# Patient Record
Sex: MALE | Race: WHITE | Employment: OTHER | URBAN - METROPOLITAN AREA
[De-identification: names, ages, dates, MRNs, and addresses within clinical notes are randomized per-mention and may not be internally consistent; named-entity substitution may affect disease eponyms.]

---

## 2017-01-24 ENCOUNTER — HOSPITAL ENCOUNTER (OUTPATIENT)
Facility: CLINIC | Age: 72
Discharge: HOME | End: 2017-01-24
Attending: OPHTHALMOLOGY | Admitting: OPHTHALMOLOGY
Payer: MEDICARE

## 2017-01-24 ENCOUNTER — SURGERY (OUTPATIENT)
Age: 72
End: 2017-01-24

## 2017-01-24 VITALS
RESPIRATION RATE: 18 BRPM | OXYGEN SATURATION: 99 % | SYSTOLIC BLOOD PRESSURE: 129 MMHG | HEART RATE: 60 BPM | TEMPERATURE: 96.6 F | DIASTOLIC BLOOD PRESSURE: 95 MMHG

## 2017-01-24 PROCEDURE — 25000132 ZZH RX MED GY IP 250 OP 250 PS 637: Mod: GY | Performed by: OPHTHALMOLOGY

## 2017-01-24 PROCEDURE — 25000125 ZZHC RX 250: Performed by: OPHTHALMOLOGY

## 2017-01-24 PROCEDURE — 66821 AFTER CATARACT LASER SURGERY: CPT | Mod: RT | Performed by: OPHTHALMOLOGY

## 2017-01-24 RX ORDER — PHENYLEPHRINE HYDROCHLORIDE 25 MG/ML
1 SOLUTION/ DROPS OPHTHALMIC ONCE
Status: COMPLETED | OUTPATIENT
Start: 2017-01-24 | End: 2017-01-24

## 2017-01-24 RX ORDER — TROPICAMIDE 10 MG/ML
1 SOLUTION/ DROPS OPHTHALMIC ONCE
Status: COMPLETED | OUTPATIENT
Start: 2017-01-24 | End: 2017-01-24

## 2017-01-24 RX ADMIN — TROPICAMIDE 1 DROP: 10 SOLUTION/ DROPS OPHTHALMIC at 12:16

## 2017-01-24 RX ADMIN — APRACLONIDINE HYDROCHLORIDE 1 DROP: 10 SOLUTION/ DROPS OPHTHALMIC at 12:17

## 2017-01-24 RX ADMIN — PHENYLEPHRINE HYDROCHLORIDE 1 DROP: 25 SOLUTION/ DROPS OPHTHALMIC at 12:16

## 2017-01-24 NOTE — BRIEF OP NOTE
[facility] Brief Operative Note    Pre-operative diagnosis: RIGHT EYE SECONDARY MEMBRANE   Post-operative diagnosis posterior capsular opacification, right eye     Procedure: Procedure(s):  RIGHT EYE YAG LASER CAPSULOTOMY   - Wound Class: I-Clean   Surgeon(s): Surgeon(s) and Role:     * Savana Kirkland MD - Primary   Estimated blood loss: * No values recorded between 1/24/2017 12:00 AM and 1/24/2017 12:42 PM *    Specimens: * No specimens in log *   Findings:

## 2017-01-24 NOTE — OP NOTE
[facility]  Ophthalmology Operative Note    Procedure: Yag laser capsulotomy  Diagnosis: secondary membrane (after cataract)  Eye: right    Surgeon: Savana Kirkland MD  Settings: 2.3 mJ energy/burst                 7 bursts    1 drop iopidine instilled after procedure.      Comments: Pt. to follow up at office in 48 hours.

## 2017-07-11 ENCOUNTER — HOSPITAL ENCOUNTER (OUTPATIENT)
Facility: CLINIC | Age: 72
End: 2017-07-11
Attending: OPHTHALMOLOGY | Admitting: OPHTHALMOLOGY
Payer: COMMERCIAL

## 2017-08-01 ENCOUNTER — SURGERY (OUTPATIENT)
Age: 72
End: 2017-08-01

## 2017-08-01 ENCOUNTER — HOSPITAL ENCOUNTER (OUTPATIENT)
Facility: CLINIC | Age: 72
Discharge: HOME | End: 2017-08-01
Attending: OPHTHALMOLOGY | Admitting: OPHTHALMOLOGY
Payer: COMMERCIAL

## 2017-08-01 PROCEDURE — 65760 KERATOMILEUSIS: CPT | Performed by: OPHTHALMOLOGY

## 2017-12-07 ENCOUNTER — TRANSFERRED RECORDS (OUTPATIENT)
Dept: HEALTH INFORMATION MANAGEMENT | Facility: CLINIC | Age: 72
End: 2017-12-07

## 2017-12-13 ENCOUNTER — TRANSFERRED RECORDS (OUTPATIENT)
Dept: HEALTH INFORMATION MANAGEMENT | Facility: CLINIC | Age: 72
End: 2017-12-13

## 2018-02-01 ENCOUNTER — TRANSFERRED RECORDS (OUTPATIENT)
Dept: HEALTH INFORMATION MANAGEMENT | Facility: CLINIC | Age: 73
End: 2018-02-01

## 2018-02-08 ENCOUNTER — TRANSFERRED RECORDS (OUTPATIENT)
Dept: HEALTH INFORMATION MANAGEMENT | Facility: CLINIC | Age: 73
End: 2018-02-08

## 2018-07-10 ENCOUNTER — HOSPITAL ENCOUNTER (OUTPATIENT)
Facility: CLINIC | Age: 73
Discharge: HOME | End: 2018-07-10
Attending: OPHTHALMOLOGY | Admitting: OPHTHALMOLOGY
Payer: MEDICARE

## 2018-07-10 ENCOUNTER — SURGERY (OUTPATIENT)
Age: 73
End: 2018-07-10

## 2018-07-10 ENCOUNTER — ANESTHESIA EVENT (OUTPATIENT)
Dept: SURGERY | Facility: CLINIC | Age: 73
End: 2018-07-10
Payer: MEDICARE

## 2018-07-10 ENCOUNTER — ANESTHESIA (OUTPATIENT)
Dept: SURGERY | Facility: CLINIC | Age: 73
End: 2018-07-10
Payer: MEDICARE

## 2018-07-10 VITALS
DIASTOLIC BLOOD PRESSURE: 92 MMHG | HEIGHT: 70 IN | OXYGEN SATURATION: 96 % | BODY MASS INDEX: 23.34 KG/M2 | RESPIRATION RATE: 16 BRPM | HEART RATE: 57 BPM | WEIGHT: 163 LBS | SYSTOLIC BLOOD PRESSURE: 150 MMHG | TEMPERATURE: 96.5 F

## 2018-07-10 PROCEDURE — 25000128 H RX IP 250 OP 636: Performed by: OPHTHALMOLOGY

## 2018-07-10 PROCEDURE — 25000125 ZZHC RX 250: Performed by: OPHTHALMOLOGY

## 2018-07-10 PROCEDURE — 71000028 ZZH EYE RECOVERY PHASE 2 EACH 15 MINS: Performed by: OPHTHALMOLOGY

## 2018-07-10 PROCEDURE — 36000102 ZZH EYE SURGERY LEVEL 3 EA 15 ADDTL MIN: Performed by: OPHTHALMOLOGY

## 2018-07-10 PROCEDURE — 25000128 H RX IP 250 OP 636: Performed by: NURSE ANESTHETIST, CERTIFIED REGISTERED

## 2018-07-10 PROCEDURE — 25000125 ZZHC RX 250: Performed by: ANESTHESIOLOGY

## 2018-07-10 PROCEDURE — 27210794 ZZH OR GENERAL SUPPLY STERILE: Performed by: OPHTHALMOLOGY

## 2018-07-10 PROCEDURE — 25000128 H RX IP 250 OP 636: Performed by: ANESTHESIOLOGY

## 2018-07-10 PROCEDURE — 40000170 ZZH STATISTIC PRE-PROCEDURE ASSESSMENT II: Performed by: OPHTHALMOLOGY

## 2018-07-10 PROCEDURE — 36000101 ZZH EYE SURGERY LEVEL 3 1ST 30 MIN: Performed by: OPHTHALMOLOGY

## 2018-07-10 PROCEDURE — 37000009 ZZH ANESTHESIA TECHNICAL FEE, EACH ADDTL 15 MIN: Performed by: OPHTHALMOLOGY

## 2018-07-10 PROCEDURE — 37000008 ZZH ANESTHESIA TECHNICAL FEE, 1ST 30 MIN: Performed by: OPHTHALMOLOGY

## 2018-07-10 RX ORDER — TETRACAINE HYDROCHLORIDE 5 MG/ML
SOLUTION OPHTHALMIC PRN
Status: DISCONTINUED | OUTPATIENT
Start: 2018-07-10 | End: 2018-07-10 | Stop reason: HOSPADM

## 2018-07-10 RX ORDER — FENTANYL CITRATE 50 UG/ML
INJECTION, SOLUTION INTRAMUSCULAR; INTRAVENOUS PRN
Status: DISCONTINUED | OUTPATIENT
Start: 2018-07-10 | End: 2018-07-10

## 2018-07-10 RX ORDER — VITS A,C,E/LUTEIN/MINERALS 300MCG-200
1 TABLET ORAL DAILY
COMMUNITY

## 2018-07-10 RX ORDER — ONDANSETRON 2 MG/ML
INJECTION INTRAMUSCULAR; INTRAVENOUS PRN
Status: DISCONTINUED | OUTPATIENT
Start: 2018-07-10 | End: 2018-07-10

## 2018-07-10 RX ORDER — SODIUM CHLORIDE, SODIUM LACTATE, POTASSIUM CHLORIDE, CALCIUM CHLORIDE 600; 310; 30; 20 MG/100ML; MG/100ML; MG/100ML; MG/100ML
INJECTION, SOLUTION INTRAVENOUS CONTINUOUS
Status: DISCONTINUED | OUTPATIENT
Start: 2018-07-10 | End: 2018-07-10 | Stop reason: HOSPADM

## 2018-07-10 RX ORDER — PROPOFOL 10 MG/ML
INJECTION, EMULSION INTRAVENOUS PRN
Status: DISCONTINUED | OUTPATIENT
Start: 2018-07-10 | End: 2018-07-10

## 2018-07-10 RX ORDER — NEOMYCIN SULFATE, POLYMYXIN B SULFATE, AND DEXAMETHASONE 3.5; 10000; 1 MG/G; [USP'U]/G; MG/G
OINTMENT OPHTHALMIC PRN
Status: DISCONTINUED | OUTPATIENT
Start: 2018-07-10 | End: 2018-07-10 | Stop reason: HOSPADM

## 2018-07-10 RX ADMIN — SODIUM CHLORIDE, POTASSIUM CHLORIDE, SODIUM LACTATE AND CALCIUM CHLORIDE: 600; 310; 30; 20 INJECTION, SOLUTION INTRAVENOUS at 13:05

## 2018-07-10 RX ADMIN — LIDOCAINE HYDROCHLORIDE 0.5 ML: 10 INJECTION, SOLUTION EPIDURAL; INFILTRATION; INTRACAUDAL; PERINEURAL at 13:05

## 2018-07-10 RX ADMIN — LIDOCAINE HYDROCHLORIDE 6.5 ML: 10; .005 INJECTION, SOLUTION EPIDURAL; INFILTRATION; INTRACAUDAL; PERINEURAL at 14:44

## 2018-07-10 RX ADMIN — FENTANYL CITRATE 25 MCG: 50 INJECTION, SOLUTION INTRAMUSCULAR; INTRAVENOUS at 14:49

## 2018-07-10 RX ADMIN — MIDAZOLAM 2 MG: 1 INJECTION INTRAMUSCULAR; INTRAVENOUS at 14:12

## 2018-07-10 RX ADMIN — NEOMYCIN SULFATE, POLYMYXIN B SULFATE, AND DEXAMETHASONE 1 TUBE: 3.5; 10000; 1 OINTMENT OPHTHALMIC at 14:53

## 2018-07-10 RX ADMIN — PROPOFOL 10 MG: 10 INJECTION, EMULSION INTRAVENOUS at 14:23

## 2018-07-10 RX ADMIN — TETRACAINE HYDROCHLORIDE 1 DROP: 5 SOLUTION OPHTHALMIC at 14:15

## 2018-07-10 RX ADMIN — FENTANYL CITRATE 25 MCG: 50 INJECTION, SOLUTION INTRAMUSCULAR; INTRAVENOUS at 14:57

## 2018-07-10 RX ADMIN — PROPOFOL 30 MG: 10 INJECTION, EMULSION INTRAVENOUS at 14:21

## 2018-07-10 RX ADMIN — ONDANSETRON 4 MG: 2 INJECTION INTRAMUSCULAR; INTRAVENOUS at 14:18

## 2018-07-10 RX ADMIN — FENTANYL CITRATE 50 MCG: 50 INJECTION, SOLUTION INTRAMUSCULAR; INTRAVENOUS at 14:12

## 2018-07-10 ASSESSMENT — LIFESTYLE VARIABLES: TOBACCO_USE: 0

## 2018-07-10 ASSESSMENT — COPD QUESTIONNAIRES: COPD: 0

## 2018-07-10 NOTE — BRIEF OP NOTE
[facility] Brief Operative Note    Pre-operative diagnosis: DERMATOCHALASIS   Post-operative diagnosis bilateral upper lid dermatochalasis     Procedure: Procedure(s):  BILATERAL UPPER LID BLEPHAROPLASTY  - Wound Class: I-Clean   Surgeon(s): Surgeon(s) and Role:     * Savana Kirkland MD - Primary   Estimated blood loss: * No values recorded between 7/10/2018  2:24 PM and 7/10/2018  3:07 PM *    Specimens: * No specimens in log *   Findings:

## 2018-07-10 NOTE — ANESTHESIA CARE TRANSFER NOTE
Patient: Los Bone    Procedure(s):  BILATERAL UPPER LID BLEPHAROPLASTY  - Wound Class: I-Clean    Diagnosis: DERMATOCHALASIS  Diagnosis Additional Information: No value filed.    Anesthesia Type:   MAC     Note:  Airway :Room Air  Patient transferred to:PACU  Handoff Report: Identifed the Patient, Identified the Reponsible Provider, Reviewed the pertinent medical history, Discussed the surgical course, Reviewed Intra-OP anesthesia mangement and issues during anesthesia, Set expectations for post-procedure period and Allowed opportunity for questions and acknowledgement of understanding    Transferred to [facility] recovery room in recliner with armrests up, spontaneous respirations, O2 saturation maintained greater than 92% with oxygen via room air. All monitors and alarms on and functioning, clinically stable vital signs. Report given to recovery RN and questions answered. Patient alert and following verbal directions.    Electronically Signed By: ZAFAR Bustamante CRNA  July 10, 2018  3:10 PM

## 2018-07-10 NOTE — DISCHARGE INSTRUCTIONS
POST-OPERATIVE CARE FOLLOWING EYELID SURGERY  DR. MERLE ROBLES  [facility]  [phone]  ICE COMPRESSES:  Immediately following surgery, you should begin to apply ice compresses.  Apply a cold gel pack, which can be purchased at your drug store, or wrap a clean washcloth around a cup of crushed ice in a plastic bag (a bag of frozen peas also works well) and hold the cold compresses directly against the closed eyelid(s). Do this at least 6 times per day for 15 minutes, but not while sleeping.  Continue cold compresses for the first 3 days after surgery, or longer if swelling persists.  OINTMENT:  Apply MAXITROL OINTMENT (from the O.R.) to the surgical area (along the sutures if present) 3 TIMES PER DAY FOR 1 WEEK OR UNTIL GONE.  If you get ointment in your eye, it will blur your vision slightly, but will not harm you eye.  ACTIVITY:  Avoid heavy lifting or vigorous exercise for one week after surgery.  You may resume regular activities as tolerated.  You may shower and wash your hair on the day after surgery, be careful to avoid getting shampoo in your eyes. Sleeping with your head elevated on an extra pillow will help with swelling.  MEDICATION:  Some discomfort and tenderness may be noticed around the eye.  You may take Extra Strength Tylenol or Advil for mild pain.    WHAT TO EXPECT:  You should expect some slight oozing of blood from the incision site over the first two to three days after surgery.  Swelling and bruising will occur for one to two weeks or longer.  You may also experience itching and tearing during the first several weeks after surgery.  This is part of the normal healing process.  CALL THE DOCTOR S OFFICE WITH ANY QUESTIONS OR CONCERNS, [phone].      [facility] Anesthesia [facility] Discharge  Instructions  Anesthesia (Eye Care Center)   Adult Discharge Instructions    For 24 hours after surgery    1. Get plenty of rest.  Make arrangements to have a responsible adult  stay with you for at least 24 hours after you leave the hospital.  2. Do not drive or use heavy equipment for 24 hours.    3. Do not drink alcohol for 24 hours.  4. Do not sign legal documents or make important decisions for 24 hours.  5. Avoid strenuous or risky activities. You may feel lightheaded.  If so, sit for a few minutes before standing.  Have someone help you get up.   6. Conscious sedation patients may resume a regular diet..  7. Any questions of medical nature, call your physician.

## 2018-07-10 NOTE — ANESTHESIA POSTPROCEDURE EVALUATION
Patient: Los Bone    Procedure(s):  BILATERAL UPPER LID BLEPHAROPLASTY  - Wound Class: I-Clean    Diagnosis:DERMATOCHALASIS  Diagnosis Additional Information: No value filed.    Anesthesia Type:  MAC    Note:  Anesthesia Post Evaluation    Patient location during evaluation: PACU  Patient participation: Able to fully participate in evaluation  Level of consciousness: awake  Pain management: adequate  Airway patency: patent  Cardiovascular status: acceptable  Respiratory status: acceptable  Hydration status: acceptable  PONV: none     Anesthetic complications: None          Last vitals:  Vitals:    07/10/18 1255 07/10/18 1509 07/10/18 1525   BP: (!) 143/93 148/90 (!) 150/92   Pulse: 57     Resp: 18 16 16   Temp: 35.8  C (96.5  F)     SpO2: 99% 98% 96%         Electronically Signed By: Robbie Rain MD  July 10, 2018  6:48 PM

## 2018-07-10 NOTE — OP NOTE
PreopDx:  Bilateral upper lid dermatochalasis  PostopDx:  Same  Proc:  Bilateral upper lid blepharoplasty:  After informed consent was obtained, the patient was brought to the operating room and prepped and draped in routine fashion.  The upper eyelid creases were marked and an appropriate amount of upper eyelid tissue was marked to obtain mild eversion of the upper eyelid margins.  A local anesthetic was administered into each upper eyelid.  After adequate anesthesia was achieved, the skin incisions were created with a 15-0 Bard Rubén blade.  An ellipse of skin and subcuticular tissue was excised.  Hemostasis was achieved with unipolar cautery.  The wounds were approximated with 4 6-0 Vicryl supratarsal fixation sutures.  The skin incisions were closed with running 6-0 plain sutures.  maxitrol ophthalmic ointment was placed topically.  The patient was discharged to post-anesthesia recovery in stable condition.  Surg:  MD Valerie  Anes:  Monitored local  Comp:  None  Disp:  stable

## 2018-07-10 NOTE — IP AVS SNAPSHOT
[facility]    [address]    [place] [address]    Phone:  [phone]    Fax:  [phone]                                       After Visit Summary   7/10/2018    Los Bone    MRN: [id number]           After Visit Summary Signature Page     I have received my discharge instructions, and my questions have been answered. I have discussed any challenges I see with this plan with the nurse or doctor.    ..........................................................................................................................................  Patient/Patient Representative Signature      ..........................................................................................................................................  Patient Representative Print Name and Relationship to Patient    ..................................................               ................................................  Date                                            Time    ..........................................................................................................................................  Reviewed by Signature/Title    ...................................................              ..............................................  Date                                                            Time

## 2018-07-10 NOTE — ANESTHESIA PREPROCEDURE EVALUATION
Anesthesia Evaluation     . Pt has had prior anesthetic.     No history of anesthetic complications          ROS/MED HX    ENT/Pulmonary:      (-) tobacco use, asthma, COPD and sleep apnea   Neurologic:       Cardiovascular:     (+) Dyslipidemia, ----. : . . . :. .      (-) CAD   METS/Exercise Tolerance:     Hematologic:         Musculoskeletal:   (+) arthritis, , , -       GI/Hepatic:        (-) GERD and liver disease   Renal/Genitourinary:     (+) chronic renal disease, type: CRI,       Endo:      (-) Type I DM and Type II DM   Psychiatric:         Infectious Disease:         Malignancy:         Other:                     Physical Exam  Normal systems: cardiovascular, pulmonary and dental    Airway   Mallampati: III  TM distance: >3 FB  Neck ROM: full    Dental     Cardiovascular       Pulmonary                     Anesthesia Plan      History & Physical Review  History and physical reviewed and following examination; no interval change.    ASA Status:  2 .    NPO Status:  > 8 hours    Plan for MAC Reason for MAC:  Procedure to face, neck, head or breast  PONV prophylaxis:  Ondansetron (or other 5HT-3)       Postoperative Care  Postoperative pain management:  IV analgesics.      Consents  Anesthetic plan, risks, benefits and alternatives discussed with:  Patient..                          .

## 2018-07-10 NOTE — IP AVS SNAPSHOT
MRN:[id number]                      After Visit Summary   7/10/2018    Los Bone    MRN: [id number]           Thank you!     Thank you for choosing [facility] for your care. Our goal is always to provide you with excellent care. Hearing back from our patients is one way we can continue to improve our services. Please take a few minutes to complete the written survey that you may receive in the mail after you visit with us. Thank you!        Patient Information     Date Of Birth          1945        About your hospital stay     You were admitted on:  July 10, 2018 You last received care in the:  [facility]    You were discharged on:  July 10, 2018       Who to Call     For medical emergencies, please call 911.  For non-urgent questions about your medical care, please call your primary care provider or clinic, [phone]  For questions related to your surgery, please call your surgery clinic        Attending Provider     Provider Merle Lambert MD Ophthalmology       Primary Care Provider Office Phone # Fax #    Placido Nelson [phone]      Further instructions from your care team       POST-OPERATIVE CARE FOLLOWING EYELID SURGERY  DR. MERLE ROBLES  [facility]  [phone]  ICE COMPRESSES:  Immediately following surgery, you should begin to apply ice compresses.  Apply a cold gel pack, which can be purchased at your drug store, or wrap a clean washcloth around a cup of crushed ice in a plastic bag (a bag of frozen peas also works well) and hold the cold compresses directly against the closed eyelid(s). Do this at least 6 times per day for 15 minutes, but not while sleeping.  Continue cold compresses for the first 3 days after surgery, or longer if swelling persists.  OINTMENT:  Apply MAXITROL OINTMENT (from the O.R.) to the surgical area (along the sutures if present) 3 TIMES PER DAY FOR 1 WEEK OR UNTIL GONE.  If you get  ointment in your eye, it will blur your vision slightly, but will not harm you eye.  ACTIVITY:  Avoid heavy lifting or vigorous exercise for one week after surgery.  You may resume regular activities as tolerated.  You may shower and wash your hair on the day after surgery, be careful to avoid getting shampoo in your eyes. Sleeping with your head elevated on an extra pillow will help with swelling.  MEDICATION:  Some discomfort and tenderness may be noticed around the eye.  You may take Extra Strength Tylenol or Advil for mild pain.    WHAT TO EXPECT:  You should expect some slight oozing of blood from the incision site over the first two to three days after surgery.  Swelling and bruising will occur for one to two weeks or longer.  You may also experience itching and tearing during the first several weeks after surgery.  This is part of the normal healing process.  CALL THE DOCTOR S OFFICE WITH ANY QUESTIONS OR CONCERNS, [phone].      [facility] Anesthesia [facility] Care [facility] Discharge  Instructions  Anesthesia (Eye Care Center)   Adult Discharge Instructions    For 24 hours after surgery    1. Get plenty of rest.  Make arrangements to have a responsible adult stay with you for at least 24 hours after you leave the hospital.  2. Do not drive or use heavy equipment for 24 hours.    3. Do not drink alcohol for 24 hours.  4. Do not sign legal documents or make important decisions for 24 hours.  5. Avoid strenuous or risky activities. You may feel lightheaded.  If so, sit for a few minutes before standing.  Have someone help you get up.   6. Conscious sedation patients may resume a regular diet..  7. Any questions of medical nature, call your physician.    Pending Results     No orders found from 7/8/2018 to 7/11/2018.            Admission Information     Date & Time Provider Department Dept. Phone    7/10/2018 Savana Kirkland MD [facility] [place] [phone]      Your Vitals Were     Blood  "Pressure Pulse Temperature Respirations Height Weight    148/90 57 96.5  F (35.8  C) (Temporal) 16 1.778 m (5' 10\") 73.9 kg (163 lb)    Pulse Oximetry BMI (Body Mass Index)                98% 23.39 kg/m2          [employer] Information     [employer] lets you send messages to your doctor, view your test results, renew your prescriptions, schedule appointments and more. To sign up, go to [email or web address] . Click on \"Log in\" on the left side of the screen, which will take you to the Welcome page. Then click on \"Sign up Now\" on the right side of the page.     You will be asked to enter the access code listed below, as well as some personal information. Please follow the directions to create your username and password.     Your access code is: [email or web address]  Expires: 10/8/2018  3:13 PM     Your access code will  in 90 days. If you need help or a new code, please call your [place] clinic or [phone].        Care EveryWhere ID     This is your Care EveryWhere ID. This could be used by other organizations to access your [place] medical records  [id number]        Equal Access to Services     RON POWELL AH: Adriane Leslie, waenmada pearladaha, qaybta kaalmada adeegyada, opal lema. [facility] [phone].    ATENCIÓN: Si habla español, tiene a deleon disposición servicios gratuitos de asistencia lingüística. Llame al [phone].    We comply with applicable federal civil rights laws and [place] laws. We do not discriminate on the basis of race, color, national origin, age, disability, sex, sexual orientation, or gender identity.               Review of your medicines      UNREVIEWED medicines. Ask your doctor about these medicines        Dose / Directions    ASPIRIN PO        Dose:  81 mg   Take 81 mg by mouth daily   Refills:  0       LEVITRA PO        Dose:  20 mg   Take 20 mg by mouth as needed   Refills:  0       multivitamin Tabs tablet        Dose:  1 tablet   Take " 1 tablet by mouth daily   Refills:  0       VITAMIN C PO        Dose:  500 mg   Take 500 mg by mouth daily   Refills:  0                Protect others around you: Learn how to safely use, store and throw away your medicines at www.disposemymeds.org.             Medication List: This is a list of all your medications and when to take them. Check marks below indicate your daily home schedule. Keep this list as a reference.      Medications           Morning Afternoon Evening Bedtime As Needed    ASPIRIN PO   Take 81 mg by mouth daily                                LEVITRA PO   Take 20 mg by mouth as needed                                multivitamin Tabs tablet   Take 1 tablet by mouth daily                                VITAMIN C PO   Take 500 mg by mouth daily

## 2019-09-17 ENCOUNTER — TRANSFERRED RECORDS (OUTPATIENT)
Dept: HEALTH INFORMATION MANAGEMENT | Facility: CLINIC | Age: 74
End: 2019-09-17

## 2019-11-07 ENCOUNTER — TRANSFERRED RECORDS (OUTPATIENT)
Dept: HEALTH INFORMATION MANAGEMENT | Facility: CLINIC | Age: 74
End: 2019-11-07

## 2019-11-20 ENCOUNTER — TRANSFERRED RECORDS (OUTPATIENT)
Dept: HEALTH INFORMATION MANAGEMENT | Facility: CLINIC | Age: 74
End: 2019-11-20

## 2019-11-26 NOTE — TELEPHONE ENCOUNTER
ONCOLOGY INTAKE: Records Information      APPT INFORMATION:  Referring provider: N/a  Referring provider s clinic:  N/a  Reason for visit/diagnosis:  Prostate Cancer  Has patient been notified of appointment date and time?: Yes    RECORDS INFORMATION:  Were the records received with the referral (via [employer])? No    Has patient been seen for any external appt for this diagnosis? Yes    If yes, where? [facility] (Dr. Edgar Ferrell)    Has patient had any imaging or procedures outside of [facility] for this condition? Yes      If Yes, where? [facility] (Dr. Edgar Ferrell)      ADDITIONAL INFORMATION:  None

## 2019-11-27 NOTE — TELEPHONE ENCOUNTER
Action Amanda 11/27 12:13PM   Action Taken Fax to [facility] to obtain recs, imgs and BX's      Action Amanda 12/2 1:11PM   Action Taken Called [facility] to check on med recs req, called and spoke with Kisha. Per Kisha she will process the request today.   BX Slides - LM for Pathologist Dr Medina to Fed ex BX Slides for Thurs 12/5 appt      Action Abi 12/3 7:30AM   Action Taken Received recs from [facility], sent to urgent scanning.   Still awaiting for BX slides

## 2019-12-02 ENCOUNTER — DOCUMENTATION ONLY (OUTPATIENT)
Dept: CARE COORDINATION | Facility: CLINIC | Age: 74
End: 2019-12-02

## 2019-12-05 ENCOUNTER — PRE VISIT (OUTPATIENT)
Dept: UROLOGY | Facility: CLINIC | Age: 74
End: 2019-12-05

## 2019-12-05 ENCOUNTER — OFFICE VISIT (OUTPATIENT)
Dept: UROLOGY | Facility: CLINIC | Age: 74
End: 2019-12-05
Payer: COMMERCIAL

## 2019-12-05 VITALS
BODY MASS INDEX: 23.05 KG/M2 | HEART RATE: 60 BPM | WEIGHT: 161 LBS | DIASTOLIC BLOOD PRESSURE: 100 MMHG | SYSTOLIC BLOOD PRESSURE: 158 MMHG | HEIGHT: 70 IN

## 2019-12-05 DIAGNOSIS — C61 PROSTATE CANCER (H): Primary | ICD-10-CM

## 2019-12-05 ASSESSMENT — MIFFLIN-ST. JEOR: SCORE: 1471.54

## 2019-12-05 ASSESSMENT — PAIN SCALES - GENERAL: PAINLEVEL: NO PAIN (0)

## 2019-12-05 NOTE — NURSING NOTE
Chief Complaint   Patient presents with     Consult For     abnormal test results x 2 weeks       Unique Pedraza MA
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
## 2019-12-05 NOTE — PATIENT INSTRUCTIONS
Please follow up in 6 months with a PSA       It was a pleasure meeting with you today.  Thank you for allowing me and my team the privilege of caring for you today.  YOU are the reason we are here, and I truly hope we provided you with the excellent service you deserve.  Please let us know if there is anything else we can do for you so that we can be sure you are leaving completely satisfied with your care experience.

## 2020-02-23 ENCOUNTER — HEALTH MAINTENANCE LETTER (OUTPATIENT)
Age: 75
End: 2020-02-23

## 2020-12-06 ENCOUNTER — HEALTH MAINTENANCE LETTER (OUTPATIENT)
Age: 75
End: 2020-12-06

## 2021-04-11 ENCOUNTER — HEALTH MAINTENANCE LETTER (OUTPATIENT)
Age: 76
End: 2021-04-11

## 2021-09-26 ENCOUNTER — HEALTH MAINTENANCE LETTER (OUTPATIENT)
Age: 76
End: 2021-09-26

## 2022-05-07 ENCOUNTER — HEALTH MAINTENANCE LETTER (OUTPATIENT)
Age: 77
End: 2022-05-07

## 2023-04-23 ENCOUNTER — HEALTH MAINTENANCE LETTER (OUTPATIENT)
Age: 78
End: 2023-04-23

## 2023-06-02 ENCOUNTER — HEALTH MAINTENANCE LETTER (OUTPATIENT)
Age: 78
End: 2023-06-02

## (undated) DEVICE — SU VICRYL 6-0 S-24DA 12" J552G

## (undated) DEVICE — PEN MARKING SKIN FINE 31145942

## (undated) DEVICE — SOL WATER IRRIG 1000ML BOTTLE 2F7114

## (undated) DEVICE — ESU EYE HIGH TEMP 65410-183

## (undated) DEVICE — GLOVE PROTEXIS W/NEU-THERA 6.5  2D73TE65

## (undated) DEVICE — DECANTER VIAL 2006S

## (undated) DEVICE — EYE PREP BETADINE 5% SOLUTION 30ML 0065-0411-30

## (undated) DEVICE — LINEN TOWEL PACK X5 5464

## (undated) DEVICE — NDL 18GA 1.5" 305196

## (undated) DEVICE — PACK OCULOPLATIC SEN15OCFSD

## (undated) DEVICE — SU PLAIN 6-0 G-1DA 18" 770G

## (undated) RX ORDER — DIAZEPAM 5 MG
TABLET ORAL
Status: DISPENSED
Start: 2017-08-01

## (undated) RX ORDER — LIDOCAINE HCL/EPINEPHRINE/PF 2%-1:200K
VIAL (ML) INJECTION
Status: DISPENSED
Start: 2018-07-10

## (undated) RX ORDER — TETRACAINE HYDROCHLORIDE 5 MG/ML
SOLUTION OPHTHALMIC
Status: DISPENSED
Start: 2018-07-10

## (undated) RX ORDER — KETOROLAC TROMETHAMINE 5 MG/ML
SOLUTION OPHTHALMIC
Status: DISPENSED
Start: 2017-08-01

## (undated) RX ORDER — IBUPROFEN 400 MG/1
TABLET, FILM COATED ORAL
Status: DISPENSED
Start: 2017-08-01

## (undated) RX ORDER — FENTANYL CITRATE 50 UG/ML
INJECTION, SOLUTION INTRAMUSCULAR; INTRAVENOUS
Status: DISPENSED
Start: 2018-07-10